# Patient Record
Sex: MALE | Race: WHITE | NOT HISPANIC OR LATINO | Employment: FULL TIME | URBAN - METROPOLITAN AREA
[De-identification: names, ages, dates, MRNs, and addresses within clinical notes are randomized per-mention and may not be internally consistent; named-entity substitution may affect disease eponyms.]

---

## 2019-10-06 ENCOUNTER — OFFICE VISIT (OUTPATIENT)
Dept: URGENT CARE | Facility: CLINIC | Age: 74
End: 2019-10-06
Payer: MEDICARE

## 2019-10-06 VITALS
TEMPERATURE: 98.7 F | RESPIRATION RATE: 16 BRPM | HEART RATE: 71 BPM | DIASTOLIC BLOOD PRESSURE: 88 MMHG | WEIGHT: 237 LBS | BODY MASS INDEX: 28.86 KG/M2 | HEIGHT: 76 IN | OXYGEN SATURATION: 97 % | SYSTOLIC BLOOD PRESSURE: 130 MMHG

## 2019-10-06 DIAGNOSIS — J01.90 ACUTE SINUSITIS, RECURRENCE NOT SPECIFIED, UNSPECIFIED LOCATION: Primary | ICD-10-CM

## 2019-10-06 PROCEDURE — G0463 HOSPITAL OUTPT CLINIC VISIT: HCPCS | Performed by: PHYSICIAN ASSISTANT

## 2019-10-06 PROCEDURE — 99213 OFFICE O/P EST LOW 20 MIN: CPT | Performed by: PHYSICIAN ASSISTANT

## 2019-10-06 RX ORDER — LISINOPRIL 10 MG/1
10 TABLET ORAL DAILY
COMMUNITY

## 2019-10-06 RX ORDER — SIMVASTATIN 80 MG
80 TABLET ORAL
COMMUNITY

## 2019-10-06 RX ORDER — AZITHROMYCIN 250 MG/1
TABLET, FILM COATED ORAL
Qty: 6 TABLET | Refills: 0 | Status: SHIPPED | OUTPATIENT
Start: 2019-10-06 | End: 2019-10-11

## 2019-10-06 NOTE — PATIENT INSTRUCTIONS
Take azithromycin as directed  Continue over-the-counter medications  Increased fluids and rest  If no improvement 3-4 days follow-up with PCP  Anything changes or worsens follow-up sooner

## 2019-10-06 NOTE — PROGRESS NOTES
NAME: Oma Amin is a 69 y o  male  : 1945    MRN: [id number]      Assessment and Plan   Acute sinusitis, recurrence not specified, unspecified location [J01 90]  1  Acute sinusitis, recurrence not specified, unspecified location  azithromycin (ZITHROMAX) 250 mg tablet           Patient Instructions   Patient Instructions   Take azithromycin as directed  Continue over-the-counter medications  Increased fluids and rest  If no improvement 3-4 days follow-up with PCP  Anything changes or worsens follow-up sooner    Proceed to ER if symptoms worsen  Chief Complaint     Chief Complaint   Patient presents with    Cough     10 days ago  at camp in [facility]  back for 3 days and returning on tuesday  Started with cough with yellow/green phlegm, nasal congestion, SOB on exertion  History of Present Illness   Patient with history of type 2 diabetes presents complaining of cough and congestion times 10 days  States 10 days ago he started with upper respiratory infection but reports that he has not been feeling any better  States that 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 and has been very cold  He states he is unable to obtain prescription medication up there and is home only for 3 days  He reports subjective intermittent fever and chills but reports that they typically resolve with over-the-counter medication  Reports some throat irritation, sinus pain or pressure and reports that he is now starting to feel some chest tightness and junk in his chest   He denies any chest pain, palpitations or shortness of breath but reports that he feels more fatigued and short of breath on exertion at times  He has been taking Mucinex, cough medications and Flonase but has not had any relief  Review of Systems   Review of Systems   Constitutional: Positive for chills and fever (Subjective)     HENT: Positive for congestion, rhinorrhea, sinus pressure and sinus pain  Negative for ear pain and sore throat  Respiratory: Positive for cough and chest tightness  Negative for shortness of breath, wheezing and stridor  Cardiovascular: Negative for chest pain and palpitations  Current Medications       Current Outpatient Medications:     lisinopril (ZESTRIL) 10 mg tablet, Take 10 mg by mouth daily, Disp: , Rfl:     simvastatin (ZOCOR) 80 mg tablet, Take 80 mg by mouth daily at bedtime, Disp: , Rfl:     sitaGLIPtin-metFORMIN (JANUMET)  MG per tablet, Take 1 tablet by mouth 2 (two) times a day with meals, Disp: , Rfl:     azithromycin (ZITHROMAX) 250 mg tablet, Take 2 tablets today then 1 tablet daily x 4 days, Disp: 6 tablet, Rfl: 0    Current Allergies     Allergies as of 10/06/2019    (No Known Allergies)              No past medical history on file  No past surgical history on file  No family history on file  Medications have been verified  The following portions of the patient's history were reviewed and updated as appropriate: allergies, current medications, past family history, past medical history, past social history, past surgical history and problem list     Objective   /88   Pulse 71   Temp 98 7 °F (37 1 °C)   Resp 16   Ht 6' 4" (1 93 m)   Wt 108 kg (237 lb)   SpO2 97%   BMI 28 85 kg/m²      Physical Exam     Physical Exam   Constitutional: He appears well-developed and well-nourished  No distress  HENT:   TMs erythematous bilaterally without injection or bulging  Purulent fluid behind both  Nasal mucosa is erythematous and edematous  Posterior oropharynx is diffusely erythematous without edema, tonsillar hypertrophy or exudates  +purulent PND   Cardiovascular: Normal rate, regular rhythm and normal heart sounds  Pulmonary/Chest: Effort normal and breath sounds normal  No stridor  No respiratory distress  He has no wheezes  He has no rales  No rhonchi rales or wheezing    Clear and equal and full breath sounds throughout  Lymphadenopathy:     He has no cervical adenopathy  Skin: He is not diaphoretic  Vitals reviewed

## 2021-03-03 DIAGNOSIS — Z23 ENCOUNTER FOR IMMUNIZATION: ICD-10-CM

## 2021-08-30 ENCOUNTER — NURSE TRIAGE (OUTPATIENT)
Dept: OTHER | Facility: OTHER | Age: 76
End: 2021-08-30

## 2021-08-30 DIAGNOSIS — Z11.52 ENCOUNTER FOR SCREENING FOR COVID-19: Primary | ICD-10-CM

## 2021-08-30 NOTE — TELEPHONE ENCOUNTER
Regarding: covid test request - travel   ----- Message from Thomas Iglesias sent at 8/30/2021  2:06 PM EDT -----  "I am traveling to [facility] and I need to get a test to travel"

## 2021-08-30 NOTE — TELEPHONE ENCOUNTER
Patient was advised of cost of testing, how to get his results and to go to [address] [place] [address] for testing  The address, hours of operation and phone number were provided

## 2024-02-26 ENCOUNTER — OFFICE VISIT (OUTPATIENT)
Dept: URGENT CARE | Facility: CLINIC | Age: 79
End: 2024-02-26
Payer: MEDICARE

## 2024-02-26 ENCOUNTER — TELEPHONE (OUTPATIENT)
Age: 79
End: 2024-02-26

## 2024-02-26 ENCOUNTER — APPOINTMENT (OUTPATIENT)
Dept: RADIOLOGY | Facility: CLINIC | Age: 79
End: 2024-02-26
Payer: MEDICARE

## 2024-02-26 VITALS
BODY MASS INDEX: 29.1 KG/M2 | TEMPERATURE: 97 F | SYSTOLIC BLOOD PRESSURE: 140 MMHG | WEIGHT: 239.1 LBS | RESPIRATION RATE: 18 BRPM | DIASTOLIC BLOOD PRESSURE: 66 MMHG | HEART RATE: 63 BPM | OXYGEN SATURATION: 98 %

## 2024-02-26 DIAGNOSIS — M79.641 HAND PAIN, RIGHT: ICD-10-CM

## 2024-02-26 DIAGNOSIS — M79.641 HAND PAIN, RIGHT: Primary | ICD-10-CM

## 2024-02-26 DIAGNOSIS — S62.91XA CLOSED FRACTURE OF RIGHT HAND, INITIAL ENCOUNTER: ICD-10-CM

## 2024-02-26 PROCEDURE — 73130 X-RAY EXAM OF HAND: CPT

## 2024-02-26 PROCEDURE — 29125 APPL SHORT ARM SPLINT STATIC: CPT | Performed by: PHYSICIAN ASSISTANT

## 2024-02-26 PROCEDURE — G0463 HOSPITAL OUTPT CLINIC VISIT: HCPCS | Performed by: PHYSICIAN ASSISTANT

## 2024-02-26 PROCEDURE — 99213 OFFICE O/P EST LOW 20 MIN: CPT | Performed by: PHYSICIAN ASSISTANT

## 2024-02-26 RX ORDER — BLOOD SUGAR DIAGNOSTIC
1 STRIP MISCELLANEOUS DAILY
COMMUNITY
Start: 2024-01-26

## 2024-02-26 RX ORDER — TADALAFIL 5 MG/1
5 TABLET ORAL DAILY
COMMUNITY

## 2024-02-26 NOTE — TELEPHONE ENCOUNTER
Patient is being referred to a orthopedics. Please schedule accordingly.    [facility]   [phone]

## 2024-02-26 NOTE — PROGRESS NOTES
[facility]        NAME: Govind Varner is a 78 y.o. male  : 1945    MRN: [id number]  DATE: 2024  TIME: 1:46 PM    Assessment and Plan   Hand pain, right [M79.641]  1. Hand pain, right  XR hand 3+ vw right    Ambulatory Referral to Orthopedic Surgery      2. Closed fracture of right hand, initial encounter  Ambulatory Referral to Orthopedic Surgery            Patient Instructions       Follow up with PCP in 3-5 days.  Proceed to  ER if symptoms worsen.    Chief Complaint     Chief Complaint   Patient presents with    Hand Pain     Patient reports he was carry a box outside the house, tripped and landed on his left shoulder in the grass. Used his right hand to brace himself, woke up this morning and hand is swollen and black and blue. Denies injury to shoulder.         History of Present Illness       Pt is a 77 y/o male presenting to the er c/o rt hand pain after he fell yesterday at home. Pt has swelling and pain to medial aspect of hadn involving 3-5th mcp.       Hand Pain   The incident occurred 12 to 24 hours ago. The incident occurred at home. Injury mechanism: fall. The pain is present in the right hand. The quality of the pain is described as aching. The pain is at a severity of 4/10. The pain is mild. The pain has been Constant since the incident. The symptoms are aggravated by movement.       Review of Systems   Review of Systems   Musculoskeletal:  Positive for arthralgias.   All other systems reviewed and are negative.        Current Medications       Current Outpatient Medications:     Accu-Chek Guide test strip, Use 1 each daily Test, Disp: , Rfl:     lisinopril (ZESTRIL) 10 mg tablet, Take 10 mg by mouth daily, Disp: , Rfl:     simvastatin (ZOCOR) 80 mg tablet, Take 80 mg by mouth daily at bedtime, Disp: , Rfl:     sitaGLIPtin-metFORMIN (JANUMET)  MG per tablet, Take 1 tablet by mouth 2 (two) times a day with meals, Disp: , Rfl:     tadalafil (CIALIS) 5 MG tablet,  Take 5 mg by mouth daily, Disp: , Rfl:     Current Allergies     Allergies as of 02/26/2024 - Reviewed 02/26/2024   Allergen Reaction Noted    Crab extract allergy skin test - food allergy GI Intolerance 08/06/2021    Shellfish-derived products - food allergy Diarrhea and GI Intolerance 10/04/2023            The following portions of the patient's history were reviewed and updated as appropriate: allergies, current medications, past family history, past medical history, past social history, past surgical history and problem list.     History reviewed. No pertinent past medical history.    History reviewed. No pertinent surgical history.    History reviewed. No pertinent family history.      Medications have been verified.        Objective   /66   Pulse 63   Temp (!) 97 °F (36.1 °C) (Tympanic)   Resp 18   Wt 108 kg (239 lb 1.6 oz)   SpO2 98%   BMI 29.10 kg/m²   No LMP for male patient.       Physical Exam     Physical Exam  Vitals and nursing note reviewed.   Constitutional:       Appearance: Normal appearance. He is normal weight.   HENT:      Head: Normocephalic and atraumatic.      Mouth/Throat:      Mouth: Mucous membranes are moist.      Pharynx: No oropharyngeal exudate or posterior oropharyngeal erythema.   Eyes:      Extraocular Movements: Extraocular movements intact.      Conjunctiva/sclera: Conjunctivae normal.   Neck:      Vascular: No carotid bruit.   Pulmonary:      Effort: Pulmonary effort is normal. No respiratory distress.   Abdominal:      Palpations: Abdomen is soft.      Tenderness: There is no abdominal tenderness.   Musculoskeletal:         General: Swelling, tenderness and signs of injury present. No deformity.      Cervical back: Normal range of motion. No rigidity or tenderness.      Right lower leg: No edema.      Left lower leg: No edema.      Comments: Exam reveal swelling and ecchymosis to rt has medially involving 3rd - 5th mct/  pt has good perfussion/  no snuff box pain.     Lymphadenopathy:      Cervical: No cervical adenopathy.   Skin:     General: Skin is warm.      Capillary Refill: Capillary refill takes less than 2 seconds.      Coloration: Skin is not jaundiced or pale.      Findings: No bruising, erythema, lesion or rash.   Neurological:      General: No focal deficit present.      Mental Status: He is alert.      Cranial Nerves: No cranial nerve deficit.      Sensory: No sensory deficit.      Motor: No weakness.      Coordination: Coordination normal.      Gait: Gait normal.   Psychiatric:         Mood and Affect: Mood normal.       Orthopedic injury treatment    Date/Time: 2/26/2024 12:30 PM    Performed by: Von Sheldon PA-C  Authorized by: Von Sheldon PA-C    Patient Location:  ED  [facility] Protocol:  Consent: Verbal consent obtained.  Risks and benefits: risks, benefits and alternatives were discussed  Consent given by: patient  Timeout called at: 2/26/2024 1:46 PM.  Patient identity confirmed: verbally with patient    Injury location:  Hand  Location details:  Right hand  Injury type:  Fracture  Fracture type: fifth metacarpal    Distal perfusion: normal    Neurological function: normal    Range of motion: reduced    Local anesthesia used?: No    General anesthesia used?: No    Manipulation performed?: No    Immobilization:  Splint and sling  Splint type:  Volar short arm  Supplies used:  Ortho-Glass and cotton padding  Distal perfusion: normal    Neurological function: normal    Range of motion: improved

## 2024-05-24 ENCOUNTER — HOSPITAL ENCOUNTER (OUTPATIENT)
Dept: HOSPITAL 99 - EMG | Age: 79
End: 2024-05-24
Payer: MEDICARE

## 2024-05-24 DIAGNOSIS — R20.0: Primary | ICD-10-CM

## 2025-03-21 ENCOUNTER — OFFICE VISIT (OUTPATIENT)
Dept: URGENT CARE | Facility: CLINIC | Age: 80
End: 2025-03-21
Payer: MEDICARE

## 2025-03-21 VITALS
TEMPERATURE: 98.2 F | BODY MASS INDEX: 26.91 KG/M2 | SYSTOLIC BLOOD PRESSURE: 152 MMHG | HEIGHT: 76 IN | DIASTOLIC BLOOD PRESSURE: 72 MMHG | OXYGEN SATURATION: 98 % | RESPIRATION RATE: 18 BRPM | WEIGHT: 221 LBS | HEART RATE: 82 BPM
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
## 2025-03-21 DIAGNOSIS — S10.96XA TICK BITE OF NECK, INITIAL ENCOUNTER: Primary | ICD-10-CM

## 2025-03-21 PROCEDURE — G0463 HOSPITAL OUTPT CLINIC VISIT: HCPCS | Performed by: FAMILY MEDICINE

## 2025-03-21 PROCEDURE — 99213 OFFICE O/P EST LOW 20 MIN: CPT | Performed by: FAMILY MEDICINE

## 2025-03-21 RX ORDER — DOXYCYCLINE 100 MG/1
200 CAPSULE ORAL ONCE
Qty: 2 CAPSULE | Refills: 0 | Status: SHIPPED | OUTPATIENT
Start: 2025-03-21 | End: 2025-03-21

## 2025-03-21 NOTE — PROGRESS NOTES
[facility]        NAME: Govind Varner is a 79 y.o. male  : 1945    MRN: [id number]  DATE: 2025  TIME: 7:55 PM    Assessment and Plan   Tick bite of neck, initial encounter [S10.96XA, W57.XXXA]  1. Tick bite of neck, initial encounter  doxycycline monohydrate (MONODOX) 100 mg capsule            Patient Instructions       Follow up with PCP in 3-5 days.  Proceed to  ER if symptoms worsen.    If tests have been performed at [facility], our office will contact you with results if changes need to be made to the care plan discussed with you at the visit.  [facility]    Chief Complaint     Chief Complaint   Patient presents with    Tick Bite     Pt presents with concern for possible tick parts still lodged in neck.          History of Present Illness       79-year-old male presenting with a tick bite to the right side of his neck.  He expresses concerns of Lyme disease and would like prophylactic treatment.    Tick Bite        Review of Systems   Review of Systems   Constitutional: Negative.    HENT: Negative.     Eyes: Negative.    Respiratory: Negative.     Cardiovascular: Negative.    Gastrointestinal: Negative.    Genitourinary: Negative.    Skin:  Positive for color change.   Allergic/Immunologic: Negative.    Neurological: Negative.    Hematological: Negative.    Psychiatric/Behavioral: Negative.           Current Medications       Current Outpatient Medications:     Accu-Chek Guide test strip, Use 1 each daily Test, Disp: , Rfl:     doxycycline monohydrate (MONODOX) 100 mg capsule, Take 2 capsules (200 mg total) by mouth 1 (one) time for 1 dose, Disp: 2 capsule, Rfl: 0    lisinopril (ZESTRIL) 10 mg tablet, Take 10 mg by mouth daily, Disp: , Rfl:     simvastatin (ZOCOR) 80 mg tablet, Take 80 mg by mouth daily at bedtime, Disp: , Rfl:     sitaGLIPtin-metFORMIN (JANUMET)  MG per tablet, Take 1 tablet by mouth 2 (two) times a day with meals,  "Disp: , Rfl:     tadalafil (CIALIS) 5 MG tablet, Take 5 mg by mouth daily (Patient not taking: Reported on 3/21/2025), Disp: , Rfl:     Current Allergies     Allergies as of 03/21/2025 - Reviewed 03/21/2025   Allergen Reaction Noted    Crab extract allergy skin test - food allergy GI Intolerance 08/06/2021    Shellfish-derived products - food allergy Diarrhea and GI Intolerance 10/04/2023            The following portions of the patient's history were reviewed and updated as appropriate: allergies, current medications, past family history, past medical history, past social history, past surgical history and problem list.     History reviewed. No pertinent past medical history.    History reviewed. No pertinent surgical history.    No family history on file.      Medications have been verified.        Objective   /72   Pulse 82   Temp 98.2 °F (36.8 °C)   Resp 18   Ht 6' 4\" (1.93 m)   Wt 100 kg (221 lb)   SpO2 98%   BMI 26.90 kg/m²   No LMP for male patient.       Physical Exam     Physical Exam  Constitutional:       Appearance: He is well-developed.   Eyes:      Pupils: Pupils are equal, round, and reactive to light.   Pulmonary:      Effort: Pulmonary effort is normal.   Musculoskeletal:         General: Normal range of motion.      Cervical back: Normal range of motion.   Skin:     General: Skin is warm.      Findings: Erythema present.          Neurological:      Mental Status: He is alert.                   "

## 2025-05-19 ENCOUNTER — OFFICE VISIT (OUTPATIENT)
Dept: URGENT CARE | Facility: CLINIC | Age: 80
End: 2025-05-19
Payer: MEDICARE

## 2025-05-19 VITALS
OXYGEN SATURATION: 98 % | RESPIRATION RATE: 18 BRPM | TEMPERATURE: 98.2 F | DIASTOLIC BLOOD PRESSURE: 66 MMHG | HEART RATE: 56 BPM | SYSTOLIC BLOOD PRESSURE: 140 MMHG

## 2025-05-19 DIAGNOSIS — S70.362A TICK BITE OF LEFT THIGH, INITIAL ENCOUNTER: Primary | ICD-10-CM
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
## 2025-05-19 PROCEDURE — G0463 HOSPITAL OUTPT CLINIC VISIT: HCPCS

## 2025-05-19 PROCEDURE — 99212 OFFICE O/P EST SF 10 MIN: CPT

## 2025-05-19 RX ORDER — DOXYCYCLINE 100 MG/1
100 TABLET ORAL 2 TIMES DAILY
Qty: 2 TABLET | Refills: 0 | Status: SHIPPED | OUTPATIENT
Start: 2025-05-19 | End: 2025-05-19

## 2025-05-19 RX ORDER — DOXYCYCLINE 100 MG/1
100 TABLET ORAL 2 TIMES DAILY
Qty: 6 TABLET | Refills: 0 | Status: SHIPPED | OUTPATIENT
Start: 2025-05-19 | End: 2025-05-22

## 2025-05-21 NOTE — ASSESSMENT & PLAN NOTE
Orders:    doxycycline (ADOXA) 100 MG tablet; Take 1 tablet (100 mg total) by mouth 2 (two) times a day for 3 days

## 2025-05-21 NOTE — PROGRESS NOTES
Name: Govind Varner      : 1945      MRN: [id number]  Encounter Provider: Andressa Lyon PA-C  Encounter Date: 2025   Encounter department: [facility]  :  Assessment & Plan  Tick bite of left thigh, initial encounter    Orders:    doxycycline (ADOXA) 100 MG tablet; Take 1 tablet (100 mg total) by mouth 2 (two) times a day for 3 days    Repeat dose of doxycycline given for repeat tick bite within a short time.  Patient knows return precautions and importance of identifying the type of tick that has bitten him in the future and trying to have an idea of how long the tick has been on if this happens again.    History of Present Illness   Tick Bite      Govind Varner is a 80 y.o. male who presents due to finding a tick on his left inner thigh that he removed himself earlier today.  Given recently being bit prior to this as well as currently he is interested in prophylaxis again given he does not know how long the tick was on his leg for.      Review of Systems   Skin:  Positive for wound.          Objective   /66   Pulse 56   Temp 98.2 °F (36.8 °C)   Resp 18   SpO2 98%      Physical Exam  Constitutional:       Appearance: Normal appearance.   HENT:      Head: Normocephalic and atraumatic.     Skin:     General: Skin is warm and dry.      Findings: Lesion present.      Comments: Very small bite tom with redness on the left anterior thigh.     Neurological:      Mental Status: He is alert.

## 2025-06-03 ENCOUNTER — OFFICE VISIT (OUTPATIENT)
Dept: URGENT CARE | Facility: CLINIC | Age: 80
End: 2025-06-03
Payer: MEDICARE

## 2025-06-03 VITALS
HEART RATE: 70 BPM | SYSTOLIC BLOOD PRESSURE: 140 MMHG | DIASTOLIC BLOOD PRESSURE: 70 MMHG | TEMPERATURE: 98.2 F | OXYGEN SATURATION: 97 % | RESPIRATION RATE: 18 BRPM

## 2025-06-03 DIAGNOSIS — L72.3 SEBACEOUS CYST OF LEFT AXILLA: Primary | ICD-10-CM

## 2025-06-03 PROCEDURE — 99213 OFFICE O/P EST LOW 20 MIN: CPT | Performed by: FAMILY MEDICINE

## 2025-06-03 PROCEDURE — G0463 HOSPITAL OUTPT CLINIC VISIT: HCPCS | Performed by: FAMILY MEDICINE

## 2025-06-03 NOTE — PROGRESS NOTES
[facility]        NAME: Govind Varner is a 80 y.o. male  : 1945    MRN: [id number]  DATE: Amparo 3, 2025  TIME: 2:07 PM    Assessment and Plan   Sebaceous cyst of left axilla [L72.3]  1. Sebaceous cyst of left axilla              Patient Instructions       Follow up with PCP in 3-5 days.  Proceed to  ER if symptoms worsen.    If tests have been performed at [facility], our office will contact you with results if changes need to be made to the care plan discussed with you at the visit.  [facility]    Chief Complaint     Chief Complaint   Patient presents with    Tick Removal     Patient states he noticed a tick under his left arm. Patient states he believes it could've been there a few days. Patient was unable to remove it himself because of the location.          History of Present Illness       80-year-old male reports feeling a small bump under his left arm while taking a shower this morning he is unsure if this is a tick that has embedded itself onto his skin.        Review of Systems   Review of Systems   Constitutional: Negative.    HENT: Negative.     Eyes: Negative.    Respiratory: Negative.     Cardiovascular: Negative.    Gastrointestinal: Negative.    Genitourinary: Negative.    Skin: Negative.    Allergic/Immunologic: Negative.    Neurological: Negative.    Hematological: Negative.    Psychiatric/Behavioral: Negative.           Current Medications     Current Medications[1]    Current Allergies     Allergies as of 2025 - Reviewed 2025   Allergen Reaction Noted    Crab extract allergy skin test - food allergy GI Intolerance 2021    Shellfish-derived products - food allergy Diarrhea and GI Intolerance 10/04/2023            The following portions of the patient's history were reviewed and updated as appropriate: allergies, current medications, past family history, past medical history, past social history, past surgical history  and problem list.     Past Medical History[2]    Past Surgical History[3]    Family History[4]      Medications have been verified.        Objective   /70   Pulse 70   Temp 98.2 °F (36.8 °C)   Resp 18   SpO2 97%   No LMP for male patient.       Physical Exam     Physical Exam  Constitutional:       Appearance: He is well-developed.     Eyes:      Pupils: Pupils are equal, round, and reactive to light.       Cardiovascular:      Rate and Rhythm: Normal rate.   Pulmonary:      Effort: Pulmonary effort is normal.     Musculoskeletal:         General: Normal range of motion.      Cervical back: Normal range of motion.     Skin:     General: Skin is warm.     Neurological:      Mental Status: He is alert.                        [1]   Current Outpatient Medications:     Accu-Chek Guide test strip, Use 1 each daily Test, Disp: , Rfl:     lisinopril (ZESTRIL) 10 mg tablet, Take 10 mg by mouth daily, Disp: , Rfl:     simvastatin (ZOCOR) 80 mg tablet, Take 80 mg by mouth daily at bedtime, Disp: , Rfl:     sitaGLIPtin-metFORMIN (JANUMET)  MG per tablet, Take 1 tablet by mouth 2 (two) times a day with meals, Disp: , Rfl:     tadalafil (CIALIS) 5 MG tablet, Take 5 mg by mouth daily (Patient not taking: Reported on 3/21/2025), Disp: , Rfl:   [2] No past medical history on file.  [3] No past surgical history on file.  [4] No family history on file.